# Patient Record
Sex: MALE | Race: WHITE | NOT HISPANIC OR LATINO | Employment: OTHER | URBAN - METROPOLITAN AREA
[De-identification: names, ages, dates, MRNs, and addresses within clinical notes are randomized per-mention and may not be internally consistent; named-entity substitution may affect disease eponyms.]

---

## 2023-07-20 ENCOUNTER — OFFICE VISIT (OUTPATIENT)
Dept: URGENT CARE | Facility: CLINIC | Age: 68
End: 2023-07-20
Payer: MEDICARE

## 2023-07-20 ENCOUNTER — HOSPITAL ENCOUNTER (EMERGENCY)
Dept: NON INVASIVE DIAGNOSTICS | Facility: HOSPITAL | Age: 68
Discharge: HOME | End: 2023-07-20
Payer: MEDICARE

## 2023-07-20 ENCOUNTER — APPOINTMENT (OUTPATIENT)
Dept: RADIOLOGY | Facility: CLINIC | Age: 68
End: 2023-07-20
Payer: MEDICARE

## 2023-07-20 ENCOUNTER — HOSPITAL ENCOUNTER (EMERGENCY)
Facility: HOSPITAL | Age: 68
Discharge: HOME | End: 2023-07-20
Attending: EMERGENCY MEDICINE
Payer: MEDICARE

## 2023-07-20 VITALS
SYSTOLIC BLOOD PRESSURE: 156 MMHG | HEIGHT: 69 IN | DIASTOLIC BLOOD PRESSURE: 93 MMHG | WEIGHT: 215 LBS | OXYGEN SATURATION: 96 % | HEART RATE: 53 BPM | RESPIRATION RATE: 20 BRPM | TEMPERATURE: 97.9 F | BODY MASS INDEX: 31.84 KG/M2

## 2023-07-20 VITALS
DIASTOLIC BLOOD PRESSURE: 80 MMHG | SYSTOLIC BLOOD PRESSURE: 140 MMHG | OXYGEN SATURATION: 96 % | RESPIRATION RATE: 18 BRPM | TEMPERATURE: 98.4 F | HEART RATE: 58 BPM

## 2023-07-20 DIAGNOSIS — S89.92XA LEFT LEG INJURY, INITIAL ENCOUNTER: ICD-10-CM

## 2023-07-20 DIAGNOSIS — I82.452 PERONEAL DVT (DEEP VENOUS THROMBOSIS), LEFT (HCC): Primary | ICD-10-CM

## 2023-07-20 DIAGNOSIS — S80.12XA CONTUSION OF LEFT LOWER LEG, INITIAL ENCOUNTER: Primary | ICD-10-CM

## 2023-07-20 DIAGNOSIS — M79.662 PAIN AND SWELLING OF LEFT LOWER LEG: ICD-10-CM

## 2023-07-20 DIAGNOSIS — Z96.7 FIXATION HARDWARE IN LEG: ICD-10-CM

## 2023-07-20 DIAGNOSIS — M79.89 PAIN AND SWELLING OF LEFT LOWER LEG: ICD-10-CM

## 2023-07-20 PROCEDURE — G0463 HOSPITAL OUTPT CLINIC VISIT: HCPCS | Performed by: NURSE PRACTITIONER

## 2023-07-20 PROCEDURE — 93971 EXTREMITY STUDY: CPT

## 2023-07-20 PROCEDURE — 99285 EMERGENCY DEPT VISIT HI MDM: CPT | Performed by: EMERGENCY MEDICINE

## 2023-07-20 PROCEDURE — 99205 OFFICE O/P NEW HI 60 MIN: CPT | Performed by: NURSE PRACTITIONER

## 2023-07-20 PROCEDURE — 73590 X-RAY EXAM OF LOWER LEG: CPT

## 2023-07-20 PROCEDURE — 93971 EXTREMITY STUDY: CPT | Performed by: SURGERY

## 2023-07-20 PROCEDURE — 99284 EMERGENCY DEPT VISIT MOD MDM: CPT

## 2023-07-20 RX ORDER — SIMVASTATIN 40 MG
TABLET ORAL
COMMUNITY
Start: 2023-06-19

## 2023-07-20 RX ORDER — EVENING PRIMROSE OIL 500 MG
100 CAPSULE ORAL DAILY
COMMUNITY

## 2023-07-20 RX ORDER — CHLORAL HYDRATE 500 MG
4000 CAPSULE ORAL DAILY
COMMUNITY

## 2023-07-20 RX ORDER — PYRIDOXINE HCL (VITAMIN B6) 50 MG
50 TABLET ORAL DAILY
COMMUNITY

## 2023-07-20 RX ORDER — ASCORBIC ACID 500 MG
1000 TABLET ORAL DAILY
COMMUNITY

## 2023-07-20 RX ORDER — OMEGA-3S/DHA/EPA/FISH OIL/D3 300MG-1000
400 CAPSULE ORAL DAILY
COMMUNITY

## 2023-07-20 RX ORDER — LEVOTHYROXINE SODIUM 0.12 MG/1
TABLET ORAL
COMMUNITY
Start: 2023-06-19

## 2023-07-20 RX ORDER — KRILL/OM-3/DHA/EPA/PHOSPHO/AST 500-110 MG
CAPSULE ORAL
COMMUNITY

## 2023-07-20 NOTE — PATIENT INSTRUCTIONS
Your xray was preliminarily read by your provider. A radiologist will read the xray and you will be notified if it is abnormal.    It was preliminarily reviewed and no acute process was seen  You are being sent to the [address] [place] [address] ED for a higher level of care and evaluation. Due to swelling, slight warm and redness and you have been driving and being sedentary - it is recommended that you have a duplex study to rule out a blood clot. This could be infection as well but you should have a clot ruled out.     Follow up with your PCP after you get home

## 2023-07-20 NOTE — DISCHARGE INSTRUCTIONS
You have a non-occlusive DVT of your left leg  We will start you on blood thinners.  Call your PCP to schedule follow up as you will need insurance approval to continue this medication  Return immediately if you develop chest pain or shortness of breath

## 2023-07-20 NOTE — PROGRESS NOTES
[facility]        NAME: Eliot Duenas is a 77 y.o. male  : 1955    MRN: [id number]  DATE: 2023  TIME: 9:58 AM    Assessment and Plan   Contusion of left lower leg, initial encounter [S80.12XA]  1. Contusion of left lower leg, initial encounter  Transfer to other facility      2. Pain and swelling of left lower leg  Transfer to other facility      3. Left leg injury, initial encounter  XR tibia fibula 2 vw left    Transfer to other facility      4. Fixation hardware in leg  Transfer to other facility            Patient Instructions       Follow up with PCP in 3-5 days. Proceed to  ER if symptoms worsen. Your xray was preliminarily read by your provider. A radiologist will read the xray and you will be notified if it is abnormal.    It was preliminarily reviewed and no acute process was seen  You are being sent to the [address] [place] [address] ED for a higher level of care and evaluation. Due to swelling, slight warm and redness and you have been driving and being sedentary - it is recommended that you have a duplex study to rule out a blood clot. This could be infection as well but you should have a clot ruled out. Follow up with your PCP after you get home         Chief Complaint     Chief Complaint   Patient presents with   • Leg Injury     Patient injured his left leg on the  of this month while fly fishing, he states he fell and his leg hit off a rock, patient now has brusing on his ankle, patient rates the pain a 4/10         History of Present Illness       This is a 77year old male who is from [address] [place] [address] and on  he was in [place] fly fishing and states he fell onto a rock hitting the left shin. He states that he has a hard swollen area along with swelling of the leg into the ankle and now has bruising collecting at the medial bottom of the foot. He states he took 2 tylenol yesterday for pain but really hasn't been too bad.  He states he has hardware in the leg and feels that the swelling is at the screw heads. He states he has been hiking and doing all things he wants to do; not really resting the leg. He is driving. He denies hx of DVT and denies fevers, chills, CP, SOB. Denies daily asa or anticoag use. Review of Systems   Review of Systems   Constitutional: Negative. HENT: Negative. Eyes: Negative. Respiratory: Negative. Cardiovascular: Negative. Gastrointestinal: Negative. Endocrine: Negative. Genitourinary: Negative. Musculoskeletal:        Left leg swelling and pain    Skin: Positive for color change. Allergic/Immunologic: Negative. Neurological: Negative. Hematological: Negative. Psychiatric/Behavioral: Negative. Current Medications       Current Outpatient Medications:   •  ascorbic acid (VITAMIN C) 500 MG tablet, Take 1,000 mg by mouth daily, Disp: , Rfl:   •  cholecalciferol (VITAMIN D3) 400 units tablet, Take 400 Units by mouth daily, Disp: , Rfl:   •  Ferrous Sulfate Dried (FERROUS SULFATE IRON PO), Take by mouth, Disp: , Rfl:   •  Krill Oil (Omega-3) 500 MG CAPS, Take by mouth, Disp: , Rfl:   •  levothyroxine 125 mcg tablet, TAKE 1 TABLET EVERY DAY BY MOUTH APPOINTMENT NEEDED. ., Disp: , Rfl:   •  Omega-3 Fatty Acids (fish oil) 1,000 mg, Take 4,000 mg by mouth daily, Disp: , Rfl:   •  POTASSIUM ACETATE IV, , Disp: , Rfl:   •  pyridoxine (VITAMIN B6) 50 mg tablet, Take 50 mg by mouth daily, Disp: , Rfl:   •  simvastatin (ZOCOR) 40 mg tablet, TAKE 1 TABLET BY MOUTH EVERY DAY AT NIGHT, Disp: , Rfl:   •  Vitamin E 45 MG (100 UNIT) CAPS, Take 100 Units by mouth daily, Disp: , Rfl:     Current Allergies     Allergies as of 07/20/2023   • (No Known Allergies)            The following portions of the patient's history were reviewed and updated as appropriate: allergies, current medications, past family history, past medical history, past social history, past surgical history and problem list.     Past Medical History:   Diagnosis Date • Disease of thyroid gland    • High cholesterol    • Hypothyroid        Past Surgical History:   Procedure Laterality Date   • LEG SURGERY Left        History reviewed. No pertinent family history. Medications have been verified. Objective   /80   Pulse 58   Temp 98.4 °F (36.9 °C)   Resp 18   SpO2 96%   No LMP for male patient. Physical Exam     Physical Exam  Vitals and nursing note reviewed. Constitutional:       General: He is not in acute distress. Appearance: Normal appearance. He is normal weight. He is not ill-appearing, toxic-appearing or diaphoretic. HENT:      Head: Normocephalic and atraumatic. Nose: Nose normal.      Mouth/Throat:      Mouth: Mucous membranes are moist.   Cardiovascular:      Rate and Rhythm: Normal rate. Pulses: Normal pulses. Pulmonary:      Effort: Pulmonary effort is normal.   Musculoskeletal:         General: Swelling, tenderness and signs of injury present. No deformity. Cervical back: Normal range of motion. Left lower leg: Edema present. Left foot: Normal range of motion. No deformity. Feet:       Comments: Left leg from knee to toes is swollen, taught but compressible. + pedal pulse. Able to bare weight and walk on leg. There is bruising at left medial heel and foot. There is slight pinkness to leg and warmth. Skin:     General: Skin is warm and dry. Capillary Refill: Capillary refill takes less than 2 seconds. Findings: Bruising present. Comments: There appears to what had been an open area over the upper left leg medial swelling area. Neurological:      General: No focal deficit present. Mental Status: He is alert and oriented to person, place, and time. Psychiatric:         Mood and Affect: Mood normal.         Behavior: Behavior normal.         Thought Content:  Thought content normal.         Judgment: Judgment normal.         Preliminary reading [facility] xray  Hardware appears intact. No osseous abnormality noted  Waiting on final read read      DDX:  Leg contusion vs DVT vs Cellulitis vs hardware abnormality    Discussed preliminary xray read with pt. No acute abnormality noted. Educated on contusion/DVT or cellulitis. Discussed sending to ED for duplex due to injury, traveling in car and being sedentary and risk for blood clots. He verbalizes understanding and agrees with [facility] ED.

## 2023-07-23 NOTE — ED PROVIDER NOTES
History  Chief Complaint   Patient presents with   • Leg Injury     Jian Billing while fishing, left lower leg injury     42-year-old male with history of hypothyroidism, hyperlipidemia presents with left-sided ankle swelling. Patient says that a week ago he fell and bumped the medial side of his upper ankle and had small bruise there. He noted today more ankle edema and bruising on the medial aspect of his left ankle. Denies history of PE or DVT. Patient went to urgent care was sent here for evaluation. Denies chest pain or shortness of breath. Prior to Admission Medications   Prescriptions Last Dose Informant Patient Reported? Taking? Ferrous Sulfate Dried (FERROUS SULFATE IRON PO)   Yes No   Sig: Take by mouth   Krill Oil (Omega-3) 500 MG CAPS   Yes No   Sig: Take by mouth   Omega-3 Fatty Acids (fish oil) 1,000 mg   Yes No   Sig: Take 4,000 mg by mouth daily   POTASSIUM ACETATE IV   Yes No   Vitamin E 45 MG (100 UNIT) CAPS   Yes No   Sig: Take 100 Units by mouth daily   ascorbic acid (VITAMIN C) 500 MG tablet   Yes No   Sig: Take 1,000 mg by mouth daily   cholecalciferol (VITAMIN D3) 400 units tablet   Yes No   Sig: Take 400 Units by mouth daily   levothyroxine 125 mcg tablet   Yes No   Sig: TAKE 1 TABLET EVERY DAY BY MOUTH APPOINTMENT NEEDED. Abraham Juarez pyridoxine (VITAMIN B6) 50 mg tablet   Yes No   Sig: Take 50 mg by mouth daily   simvastatin (ZOCOR) 40 mg tablet   Yes No   Sig: TAKE 1 TABLET BY MOUTH EVERY DAY AT NIGHT      Facility-Administered Medications: None       Past Medical History:   Diagnosis Date   • Disease of thyroid gland    • High cholesterol    • Hypothyroid        Past Surgical History:   Procedure Laterality Date   • LEG SURGERY Left        History reviewed. No pertinent family history. I have reviewed and agree with the history as documented.     E-Cigarette/Vaping   • E-Cigarette Use Never User      E-Cigarette/Vaping Substances     Social History     Tobacco Use   • Smoking status: Never • Smokeless tobacco: Never   Vaping Use   • Vaping Use: Never used   Substance Use Topics   • Alcohol use: Not Currently   • Drug use: Never       Review of Systems    Physical Exam  Physical Exam  Vitals and nursing note reviewed. Constitutional:       General: He is not in acute distress. Appearance: Normal appearance. HENT:      Head: Normocephalic and atraumatic. Nose: Nose normal.   Eyes:      General:         Right eye: No discharge. Left eye: No discharge. Cardiovascular:      Rate and Rhythm: Normal rate and regular rhythm. Pulmonary:      Effort: Pulmonary effort is normal. No respiratory distress. Abdominal:      General: Abdomen is flat. There is no distension. Musculoskeletal:         General: No deformity. Normal range of motion. Comments: Mild edema in the ankle with ecchymosis on the medial side without tenderness  Superficial ecchymosis to the left medial calf, positive Nena on the left, calf tenderness   Skin:     General: Skin is warm. Findings: No rash. Neurological:      Mental Status: He is alert and oriented to person, place, and time. Motor: No weakness.    Psychiatric:         Mood and Affect: Mood normal.         Behavior: Behavior normal.         Vital Signs  ED Triage Vitals [07/20/23 1018]   Temperature Pulse Respirations Blood Pressure SpO2   97.9 °F (36.6 °C) (!) 53 20 156/93 96 %      Temp Source Heart Rate Source Patient Position - Orthostatic VS BP Location FiO2 (%)   Temporal Monitor Sitting Left arm --      Pain Score       4           Vitals:    07/20/23 1018   BP: 156/93   Pulse: (!) 53   Patient Position - Orthostatic VS: Sitting         Visual Acuity      ED Medications  Medications - No data to display    Diagnostic Studies  Results Reviewed     None                 VAS lower limb venous duplex study, unilateral/limited   Final Result by Ileana Chan MD (07/20 1550)                 Procedures  Procedures         ED Course SBIRT 22yo+    Flowsheet Row Most Recent Value   Initial Alcohol Screen: US AUDIT-C     1. How often do you have a drink containing alcohol? 0 Filed at: 07/20/2023 1023   2. How many drinks containing alcohol do you have on a typical day you are drinking? 0 Filed at: 07/20/2023 1023   3a. Male UNDER 65: How often do you have five or more drinks on one occasion? 0 Filed at: 07/20/2023 1023   3b. FEMALE Any Age, or MALE 65+: How often do you have 4 or more drinks on one occassion? 0 Filed at: 07/20/2023 1023   Audit-C Score 0 Filed at: 07/20/2023 1023   VICENTA: How many times in the past year have you. .. Used an illegal drug or used a prescription medication for non-medical reasons? Never Filed at: 07/20/2023 1023                    Medical Decision Making  77-year-old male presents with left-sided ankle edema and calf tenderness after superficial injury  No bony tenderness to  Suspect dependent edema, however patient also requires rule out for DVT. Ultrasound was ordered. I discussed with the ultrasound technician which shows a nonocclusive peroneal DVT below the knee  Discussed with patient that these can be anticoagulated as patient does not have any significant bleeding contraindications. He has no chest pain, shortness of breath, hypoxia, or tachycardia to suggest pulmonary embolism  Patient understands that he has to follow-up with his PCP and I recommend that he call later today for further treatment as I was able to prescribe him a starter pack of Eliquis and given a coupon for a month supply however for continued treatment and care he will require PCP follow-up. I suspect this is a provoked DVT from his recent travel and injury. Peroneal DVT (deep venous thrombosis), left [facility]): acute illness or injury  Risk  Prescription drug management.           Disposition  Final diagnoses:   Peroneal DVT (deep venous thrombosis), left ([address])     Time reflects when diagnosis was documented in both MDM as applicable and the Disposition within this note     Time User Action Codes Description Comment    7/20/2023 11:36 AM Lesterjuan alberto Moran Add [I82.452] Peroneal DVT (deep venous thrombosis), left [facility])       ED Disposition     ED Disposition   Discharge    Condition   Stable    Date/Time   Thu Jul 20, 2023 11:38 AM    Comment   Jamaal Russo discharge to home/self care. Follow-up Information     Follow up With Specialties Details Why Contact Info Additional Information    [address] In 1 day As needed [address] [id number] [phone] [address] [facility], [address], [place], [place], [address], [phone]          Discharge Medication List as of 7/20/2023 11:42 AM      START taking these medications    Details   apixaban (Eliquis) 5 mg Multiple Dosages:Starting Thu 7/20/2023, Until Wed 7/26/2023 at 2359, THEN Starting Thu 7/27/2023, Until Fri 8/18/2023 at 2359Take 2 tablets (10 mg total) by mouth 2 (two) times a day for 7 days, THEN 1 tablet (5 mg total) 2 (two) times a day for 23 d ays., Normal         CONTINUE these medications which have NOT CHANGED    Details   ascorbic acid (VITAMIN C) 500 MG tablet Take 1,000 mg by mouth daily, Historical Med      cholecalciferol (VITAMIN D3) 400 units tablet Take 400 Units by mouth daily, Historical Med      Ferrous Sulfate Dried (FERROUS SULFATE IRON PO) Take by mouth, Historical Med      Krill Oil (Omega-3) 500 MG CAPS Take by mouth, Historical Med      levothyroxine 125 mcg tablet TAKE 1 TABLET EVERY DAY BY MOUTH APPOINTMENT NEEDED. ., Historical Med      Omega-3 Fatty Acids (fish oil) 1,000 mg Take 4,000 mg by mouth daily, Historical Med      POTASSIUM ACETATE IV Historical Med      pyridoxine (VITAMIN B6) 50 mg tablet Take 50 mg by mouth daily, Historical Med      simvastatin (ZOCOR) 40 mg tablet TAKE 1 TABLET BY MOUTH EVERY DAY AT NIGHT, Historical Med      Vitamin E 45 MG (100 UNIT) CAPS Take 100 Units by mouth daily, Historical Med             No discharge procedures on file.     PDMP Review     None          ED Provider  Electronically Signed by           Erika Garza DO  07/22/23 2685